# Patient Record
Sex: FEMALE | Race: WHITE | Employment: FULL TIME | URBAN - METROPOLITAN AREA
[De-identification: names, ages, dates, MRNs, and addresses within clinical notes are randomized per-mention and may not be internally consistent; named-entity substitution may affect disease eponyms.]

---

## 2017-04-10 ENCOUNTER — OP HISTORICAL/CONVERTED ENCOUNTER (OUTPATIENT)
Dept: OTHER | Age: 47
End: 2017-04-10

## 2017-05-12 ENCOUNTER — OP HISTORICAL/CONVERTED ENCOUNTER (OUTPATIENT)
Dept: OTHER | Age: 47
End: 2017-05-12

## 2017-09-12 ENCOUNTER — HOSPITAL ENCOUNTER (OUTPATIENT)
Dept: MAMMOGRAPHY | Age: 47
Discharge: HOME | End: 2017-09-12
Attending: OBSTETRICS & GYNECOLOGY
Payer: COMMERCIAL

## 2017-09-12 DIAGNOSIS — Z12.31 VISIT FOR SCREENING MAMMOGRAM: ICD-10-CM

## 2017-09-12 PROCEDURE — 77067 SCR MAMMO BI INCL CAD: CPT

## 2018-03-22 ENCOUNTER — OP HISTORICAL/CONVERTED ENCOUNTER (OUTPATIENT)
Dept: OTHER | Age: 48
End: 2018-03-22

## 2018-10-09 ENCOUNTER — HOSPITAL ENCOUNTER (OUTPATIENT)
Dept: MAMMOGRAPHY | Age: 48
Discharge: HOME | End: 2018-10-09
Attending: OBSTETRICS & GYNECOLOGY
Payer: COMMERCIAL

## 2018-10-09 DIAGNOSIS — Z12.39 SCREENING BREAST EXAMINATION: ICD-10-CM

## 2018-10-09 PROCEDURE — 77067 SCR MAMMO BI INCL CAD: CPT

## 2019-10-28 ENCOUNTER — HOSPITAL ENCOUNTER (OUTPATIENT)
Dept: MAMMOGRAPHY | Age: 49
Discharge: HOME | End: 2019-10-28
Attending: OBSTETRICS & GYNECOLOGY
Payer: COMMERCIAL

## 2019-10-28 DIAGNOSIS — Z12.31 ENCOUNTER FOR MAMMOGRAM TO ESTABLISH BASELINE MAMMOGRAM: ICD-10-CM

## 2019-10-28 PROCEDURE — 77067 SCR MAMMO BI INCL CAD: CPT

## 2020-10-07 ENCOUNTER — TRANSCRIBE ORDER (OUTPATIENT)
Dept: MAMMOGRAPHY | Age: 50
End: 2020-10-07

## 2020-10-07 DIAGNOSIS — Z12.31 VISIT FOR SCREENING MAMMOGRAM: Primary | ICD-10-CM

## 2020-10-29 ENCOUNTER — HOSPITAL ENCOUNTER (OUTPATIENT)
Dept: MAMMOGRAPHY | Age: 50
Discharge: HOME | End: 2020-10-29
Attending: OBSTETRICS & GYNECOLOGY
Payer: COMMERCIAL

## 2020-10-29 DIAGNOSIS — Z12.31 VISIT FOR SCREENING MAMMOGRAM: ICD-10-CM

## 2020-10-29 PROCEDURE — 77067 SCR MAMMO BI INCL CAD: CPT

## 2021-10-15 ENCOUNTER — TRANSCRIBE ORDER (OUTPATIENT)
Dept: SCHEDULING | Age: 51
End: 2021-10-15

## 2021-10-15 DIAGNOSIS — Z12.31 SCREENING MAMMOGRAM, ENCOUNTER FOR: Primary | ICD-10-CM

## 2021-11-01 ENCOUNTER — HOSPITAL ENCOUNTER (OUTPATIENT)
Dept: MAMMOGRAPHY | Age: 51
Discharge: HOME | End: 2021-11-01
Attending: OBSTETRICS & GYNECOLOGY
Payer: COMMERCIAL

## 2021-11-01 DIAGNOSIS — Z12.31 SCREENING MAMMOGRAM, ENCOUNTER FOR: ICD-10-CM

## 2021-11-01 PROCEDURE — 77067 SCR MAMMO BI INCL CAD: CPT

## 2021-11-24 ENCOUNTER — ANCILLARY PROCEDURE (OUTPATIENT)
Dept: CARDIOLOGY CLINIC | Age: 51
End: 2021-11-24

## 2021-11-24 ENCOUNTER — ANCILLARY PROCEDURE (OUTPATIENT)
Dept: CARDIOLOGY CLINIC | Age: 51
End: 2021-11-24
Payer: COMMERCIAL

## 2021-11-24 ENCOUNTER — OFFICE VISIT (OUTPATIENT)
Dept: CARDIOLOGY CLINIC | Age: 51
End: 2021-11-24

## 2021-11-24 VITALS
DIASTOLIC BLOOD PRESSURE: 78 MMHG | HEIGHT: 65 IN | BODY MASS INDEX: 27.82 KG/M2 | SYSTOLIC BLOOD PRESSURE: 110 MMHG | OXYGEN SATURATION: 98 % | HEART RATE: 72 BPM | WEIGHT: 167 LBS

## 2021-11-24 VITALS — WEIGHT: 167 LBS | HEIGHT: 65 IN | BODY MASS INDEX: 27.82 KG/M2

## 2021-11-24 DIAGNOSIS — I10 PRIMARY HYPERTENSION: ICD-10-CM

## 2021-11-24 DIAGNOSIS — R94.31 ABNORMAL EKG: ICD-10-CM

## 2021-11-24 DIAGNOSIS — R07.9 CHEST PAIN, UNSPECIFIED TYPE: Primary | ICD-10-CM

## 2021-11-24 DIAGNOSIS — R07.9 CHEST PAIN, UNSPECIFIED TYPE: ICD-10-CM

## 2021-11-24 LAB
ECHO AO ASC DIAM: 2.95 CM
ECHO AO ROOT DIAM: 3.26 CM
ECHO EST RA PRESSURE: 3 MMHG
ECHO LA AREA 4C: 11.75 CM2
ECHO LA MAJOR AXIS: 3.53 CM
ECHO LA MINOR AXIS: 1.93 CM
ECHO LA VOL 2C: 48.94 ML (ref 22–52)
ECHO LA VOL 4C: 28.76 ML (ref 22–52)
ECHO LA VOL BP: 41.7 ML (ref 22–52)
ECHO LA VOL/BSA BIPLANE: 22.79 ML/M2 (ref 16–28)
ECHO LA VOLUME INDEX A2C: 26.74 ML/M2 (ref 16–28)
ECHO LA VOLUME INDEX A4C: 15.72 ML/M2 (ref 16–28)
ECHO LV EDV A2C: 102.53 ML
ECHO LV EDV A4C: 110.41 ML
ECHO LV EDV BP: 106.31 ML (ref 56–104)
ECHO LV EDV INDEX A4C: 60.3 ML/M2
ECHO LV EDV INDEX BP: 58.1 ML/M2
ECHO LV EDV NDEX A2C: 56 ML/M2
ECHO LV EJECTION FRACTION A2C: 63 PERCENT
ECHO LV EJECTION FRACTION A4C: 60 PERCENT
ECHO LV EJECTION FRACTION BIPLANE: 61.2 PERCENT (ref 55–100)
ECHO LV ESV A2C: 38.2 ML
ECHO LV ESV A4C: 43.66 ML
ECHO LV ESV BP: 41.26 ML (ref 19–49)
ECHO LV ESV INDEX A2C: 20.9 ML/M2
ECHO LV ESV INDEX A4C: 23.9 ML/M2
ECHO LV ESV INDEX BP: 22.5 ML/M2
ECHO LV INTERNAL DIMENSION DIASTOLIC: 5 CM (ref 3.9–5.3)
ECHO LV INTERNAL DIMENSION SYSTOLIC: 3.27 CM
ECHO LV IVSD: 0.56 CM (ref 0.6–0.9)
ECHO LV MASS 2D: 109.6 G (ref 67–162)
ECHO LV MASS INDEX 2D: 59.9 G/M2 (ref 43–95)
ECHO LV POSTERIOR WALL DIASTOLIC: 0.79 CM (ref 0.6–0.9)
ECHO RIGHT VENTRICULAR SYSTOLIC PRESSURE (RVSP): 20.58 MMHG
ECHO TV REGURGITANT MAX VELOCITY: 209.66 CM/S
ECHO TV REGURGITANT PEAK GRADIENT: 17.58 MMHG
STRESS ANGINA INDEX: 1
STRESS BASELINE DIAS BP: 76 MMHG
STRESS BASELINE HR: 62 BPM
STRESS BASELINE SYS BP: 116 MMHG
STRESS ESTIMATED WORKLOAD: 10.1 METS
STRESS EXERCISE DUR MIN: NORMAL
STRESS O2 SAT PEAK: 99 %
STRESS O2 SAT REST: 99 %
STRESS PEAK DIAS BP: 84 MMHG
STRESS PEAK SYS BP: 158 MMHG
STRESS PERCENT HR ACHIEVED: 93 %
STRESS POST PEAK HR: 155 BPM
STRESS RATE PRESSURE PRODUCT: NORMAL BPM*MMHG
STRESS ST DEPRESSION: 1 MM
STRESS TARGET HR: 167 BPM

## 2021-11-24 PROCEDURE — 99205 OFFICE O/P NEW HI 60 MIN: CPT | Performed by: SPECIALIST

## 2021-11-24 PROCEDURE — 93321 DOPPLER ECHO F-UP/LMTD STD: CPT | Performed by: SPECIALIST

## 2021-11-24 PROCEDURE — 93000 ELECTROCARDIOGRAM COMPLETE: CPT | Performed by: SPECIALIST

## 2021-11-24 PROCEDURE — 93325 DOPPLER ECHO COLOR FLOW MAPG: CPT | Performed by: SPECIALIST

## 2021-11-24 PROCEDURE — 93308 TTE F-UP OR LMTD: CPT | Performed by: SPECIALIST

## 2021-11-24 PROCEDURE — 93015 CV STRESS TEST SUPVJ I&R: CPT | Performed by: SPECIALIST

## 2021-11-24 RX ORDER — BUDESONIDE AND FORMOTEROL FUMARATE DIHYDRATE 160; 4.5 UG/1; UG/1
2 AEROSOL RESPIRATORY (INHALATION) AS NEEDED
Qty: 10.2 G | Refills: 0
Start: 2021-11-24

## 2021-11-24 RX ORDER — PROGESTERONE 200 MG/1
200 CAPSULE ORAL DAILY
COMMUNITY

## 2021-11-24 RX ORDER — DULOXETIN HYDROCHLORIDE 60 MG/1
60 CAPSULE, DELAYED RELEASE ORAL DAILY
Qty: 1 CAPSULE | Refills: 0 | Status: SHIPPED | OUTPATIENT
Start: 2021-11-24

## 2021-11-24 RX ORDER — SERTRALINE HYDROCHLORIDE 25 MG/1
100 TABLET, FILM COATED ORAL DAILY
Qty: 1 TABLET | Refills: 0
Start: 2021-11-24

## 2021-11-24 NOTE — PROGRESS NOTES
Orders for Treadmill stress test and echo today   See NP in one month   per Dr. Stephie Luu VO.   Dx: abn ekg

## 2021-11-24 NOTE — H&P (VIEW-ONLY)
Priyank Smith MD. [facility]              Patient: Mulu Mai  : 1970      Today's Date: 2021          HISTORY OF PRESENT ILLNESS:     History of Present Illness:  Referred from [facility] for CP. Not felt well for weeks- has had general chest tightness - one day at work not feeling well, broke out in sweat --> went to [facility] and asked to see Cardiology. Since then just feels \"blah\" with mild chest discomfort. Since then BP high one day and has been following it at home - has been up and down. Walking up and down steps causes some discomfort. PAST MEDICAL HISTORY:     Past Medical History:   Diagnosis Date    Asthma     Elevated blood pressure reading     Mood disorder (HCC)     MADISON on CPAP          MEDICATIONS:     Current Outpatient Medications   Medication Sig Dispense Refill    sertraline (ZOLOFT) 25 mg tablet Take 4 Tablets by mouth daily. 1 Tablet 0    DULoxetine (CYMBALTA) 60 mg capsule Take 1 Capsule by mouth daily. 1 Capsule 0    budesonide-formoteroL (SYMBICORT) 160-4.5 mcg/actuation HFAA Take 2 Puffs by inhalation as needed (sob). 10.2 g 0           Allergies   Allergen Reactions    Ofloxacin Vertigo, Nausea Only, Nausea and Vomiting, Other (comments) and Unknown (comments)           SOCIAL HISTORY:     Social History     Tobacco Use    Smoking status: Never Smoker    Smokeless tobacco: Never Used   Vaping Use    Vaping Use: Never used   Substance Use Topics    Alcohol use: Never    Drug use: Never         FAMILY HISTORY:     Family History   Problem Relation Age of Onset    Breast Cancer Maternal Aunt     Hypertension Father     Heart Attack Paternal Grandmother     Atrial Fibrillation Mother              REVIEW OF SYMPTOMS:     Review of Symptoms:  Constitutional: Negative for fever, chills  HEENT: Negative for nosebleeds, tinnitus, and vision changes.    Respiratory: Negative for cough, wheezing  Cardiovascular: Negative for orthopnea, claudication, syncope, and PND. Gastrointestinal: Negative for abdominal pain, diarrhea, melena. Genitourinary: Negative for dysuria  Musculoskeletal: Negative for myalgias. Skin: Negative for rash  Heme: No problems bleeding. Neurological: Negative for speech change and focal weakness. + insomnia       PHYSICAL EXAM:     Physical Exam:  Visit Vitals  /78 (BP 1 Location: Left upper arm, BP Patient Position: Sitting, BP Cuff Size: Large adult)   Pulse 72   Ht 5' 5\" (1.651 m)   Wt 167 lb (75.8 kg)   SpO2 98%   BMI 27.79 kg/m²     Patient appears generally well, mood and affect are appropriate and pleasant. HEENT:  Hearing intact, non-icteric, normocephalic, atraumatic. Neck Exam: Supple, No JVD or carotid bruits. Lung Exam: Clear to auscultation, even breath sounds. Cardiac Exam: Regular rate and rhythm with no murmur or rub  Abdomen: Soft, non-tender, normal bowel sounds. No bruits or masses. Extremities: Moves all ext well. No lower extremity edema. MSKTL: Overall good ROM ext  Skin: No significant rashes  Vascular: 2+ dorsalis pedis pulses bilaterally. Psych: Appropriate affect  Neuro - Grossly intact        LABS / OTHER STUDIES reviewed:     Lab Results   Component Value Date/Time    Sodium 136 07/16/2010 03:17 PM    Potassium 3.9 07/16/2010 03:17 PM    Chloride 96 (L) 07/16/2010 03:17 PM    CO2 33 (H) 07/16/2010 03:17 PM    Anion gap 7 07/16/2010 03:17 PM    Glucose 82 07/16/2010 03:17 PM    BUN 14 07/16/2010 03:17 PM    Creatinine 0.6 07/16/2010 03:17 PM    BUN/Creatinine ratio 23 (H) 07/16/2010 03:17 PM    GFR est AA >60 07/16/2010 03:17 PM    GFR est non-AA >60 07/16/2010 03:17 PM    Calcium 9.4 07/16/2010 03:17 PM           CARDIAC DIAGNOSTICS:     Cardiac Evaluation Includes:  I reviewed the results below.      EKG 11/24/21 - sinus, septal Q waves       Treadmill Stress Test 11/24/21 - walked 8 min (10 METS),   Ischemic EKG changes with 1 mm slow upsloping ST depression in the inferior leads at peak exercise and 1 mm horizontal ST depression in the inferior leads in recovery. 5/10 chest pain at peak exercise    Limited Echo 11/24/21 - LVEF 61%, no valve disease           ASSESSMENT AND PLAN:     Assessment and Plan:  1) Typical CP   - has not felt well for weeks with chest discomfort that is worse with exertion   - We proceeded with a treadmill stress test 11/24/21 ---> she had exertional chest pain and 1 mm ST depression. Moderate risk Duke Treadmill Score   - I reviewed options for further evaluation (stress nuclear, coronary CTA, vs Cath) --> given that she has not felt well for weeks, she opted to proceed with a cardiac cath. Risks of MI, CVA, GISELL, death, etc reviewed. - I did advise her to go to ED if she has worsening CP    2) CV risk assessment   - info given on  CT heart scan as optional test (after cardiac cath)  - will get recent labs from PCP     3) HTN  - BP has been up and down   - Asked her to keep track of BP at home BID to help guide care     4) Cardiac cath in next 1-2 weeks. See NP in one month   [facility]. Caring for ill father.  with 2 grown kids. Helder Hampton MD, [address], [facility] [address]  Ph: [phone]   Ph [phone]

## 2021-11-24 NOTE — PATIENT INSTRUCTIONS
High Blood Pressure: Care Instructions  Overview     It's normal for blood pressure to go up and down throughout the day. But if it stays up, you have high blood pressure. Another name for high blood pressure is hypertension. Despite what a lot of people think, high blood pressure usually doesn't cause headaches or make you feel dizzy or lightheaded. It usually has no symptoms. But it does increase your risk of stroke, heart attack, and other problems. You and your doctor will talk about your risks of these problems based on your blood pressure. Your doctor will give you a goal for your blood pressure. Your goal will be based on your health and your age. Lifestyle changes, such as eating healthy and being active, are always important to help lower blood pressure. You might also take medicine to reach your blood pressure goal.  Follow-up care is a key part of your treatment and safety. Be sure to make and go to all appointments, and call your doctor if you are having problems. It's also a good idea to know your test results and keep a list of the medicines you take. How can you care for yourself at home? Medical treatment  · If you stop taking your medicine, your blood pressure will go back up. You may take one or more types of medicine to lower your blood pressure. Be safe with medicines. Take your medicine exactly as prescribed. Call your doctor if you think you are having a problem with your medicine. · Talk to your doctor before you start taking aspirin every day. Aspirin can help certain people lower their risk of a heart attack or stroke. But taking aspirin isn't right for everyone, because it can cause serious bleeding. · See your doctor regularly. You may need to see the doctor more often at first or until your blood pressure comes down. · If you are taking blood pressure medicine, talk to your doctor before you take decongestants or anti-inflammatory medicine, such as ibuprofen.  Some of these medicines can raise blood pressure. · Learn how to check your blood pressure at home. Lifestyle changes  · Stay at a healthy weight. This is especially important if you put on weight around the waist. Losing even 10 pounds can help you lower your blood pressure. · If your doctor recommends it, get more exercise. Walking is a good choice. Bit by bit, increase the amount you walk every day. Try for at least 30 minutes on most days of the week. You also may want to swim, bike, or do other activities. · Avoid or limit alcohol. Talk to your doctor about whether you can drink any alcohol. · Try to limit how much sodium you eat to less than 2,300 milligrams (mg) a day. Your doctor may ask you to try to eat less than 1,500 mg a day. · Eat plenty of fruits (such as bananas and oranges), vegetables, legumes, whole grains, and low-fat dairy products. · Lower the amount of saturated fat in your diet. Saturated fat is found in animal products such as milk, cheese, and meat. Limiting these foods may help you lose weight and also lower your risk for heart disease. · Do not smoke. Smoking increases your risk for heart attack and stroke. If you need help quitting, talk to your doctor about stop-smoking programs and medicines. These can increase your chances of quitting for good. When should you call for help? Call 911  anytime you think you may need emergency care. This may mean having symptoms that suggest that your blood pressure is causing a serious heart or blood vessel problem. Your blood pressure may be over 180/120. For example, call 911 if:    · You have symptoms of a heart attack. These may include:  ? Chest pain or pressure, or a strange feeling in the chest.  ? Sweating. ? Shortness of breath. ? Nausea or vomiting. ? Pain, pressure, or a strange feeling in the back, neck, jaw, or upper belly or in one or both shoulders or arms. ? Lightheadedness or sudden weakness.   ? A fast or irregular heartbeat.     · You have symptoms of a stroke. These may include:  ? Sudden numbness, tingling, weakness, or loss of movement in your face, arm, or leg, especially on only one side of your body. ? Sudden vision changes. ? Sudden trouble speaking. ? Sudden confusion or trouble understanding simple statements. ? Sudden problems with walking or balance. ? A sudden, severe headache that is different from past headaches.     · You have severe back or belly pain. Do not wait until your blood pressure comes down on its own. Get help right away. Call your doctor now or seek immediate care if:    · Your blood pressure is much higher than normal (such as 180/120 or higher), but you don't have symptoms.     · You think high blood pressure is causing symptoms, such as:  ? Severe headache.  ? Blurry vision. Watch closely for changes in your health, and be sure to contact your doctor if:    · Your blood pressure measures higher than your doctor recommends at least 2 times. That means the top number is higher or the bottom number is higher, or both.     · You think you may be having side effects from your blood pressure medicine. Where can you learn more? Go to http://www.gray.com/  Enter [id number] in the search box to learn more about \"High Blood Pressure: Care Instructions. \"  Current as of: April 29, 2021               Content Version: 13.0  © 2006-2021 [employer]. Care instructions adapted under license by [employer] (which disclaims liability or warranty for this information). If you have questions about a medical condition or this instruction, always ask your healthcare professional. [place] [address] any warranty or liability for your use of this information.

## 2021-11-24 NOTE — PROGRESS NOTES
Identified pt with two pt identifiers(name and ). Reviewed record in preparation for visit and have obtained necessary documentation. Chief Complaint   Patient presents with   [address] Patient     seen at [facility] on 21 for chest discomfort    Abnormal EKG        Vitals:    21 0852   BP: 110/78   Pulse: 72   SpO2: 98%   Weight: 167 lb (75.8 kg)   Height: 5' 5\" (1.651 m)   PainSc:   5   PainLoc: Generalized       Health Maintenance Due   Topic    Hepatitis C Screening     COVID-19 Vaccine (1)    DTaP/Tdap/Td series (1 - Tdap)    Cervical cancer screen     Lipid Screen     Colorectal Cancer Screening Combo     Shingrix Vaccine Age 50> (1 of 2)    Flu Vaccine (1)       Coordination of Care Questionnaire:  :   1) Have you been to an emergency room, urgent care, or hospitalized since your last visit? If yes, where when, and reason for visit? N/A      2. Have seen or consulted any other health care provider since your last visit? If yes, where when, and reason for visit?   N/A

## 2021-11-24 NOTE — PROGRESS NOTES
Wolf Magallanes MD. [facility] [place]              Patient: Taryn Hurt  : 1970      Today's Date: 2021          HISTORY OF PRESENT ILLNESS:     History of Present Illness:  Referred from [facility] for CP. Not felt well for weeks- has had general chest tightness - one day at work not feeling well, broke out in sweat --> went to [facility] and asked to see Cardiology. Since then just feels \"blah\" with mild chest discomfort. Since then BP high one day and has been following it at home - has been up and down. Walking up and down steps causes some discomfort. PAST MEDICAL HISTORY:     Past Medical History:   Diagnosis Date    Asthma     Elevated blood pressure reading     Mood disorder (HCC)     MADISON on CPAP          MEDICATIONS:     Current Outpatient Medications   Medication Sig Dispense Refill    sertraline (ZOLOFT) 25 mg tablet Take 4 Tablets by mouth daily. 1 Tablet 0    DULoxetine (CYMBALTA) 60 mg capsule Take 1 Capsule by mouth daily. 1 Capsule 0    budesonide-formoteroL (SYMBICORT) 160-4.5 mcg/actuation HFAA Take 2 Puffs by inhalation as needed (sob). 10.2 g 0           Allergies   Allergen Reactions    Ofloxacin Vertigo, Nausea Only, Nausea and Vomiting, Other (comments) and Unknown (comments)           SOCIAL HISTORY:     Social History     Tobacco Use    Smoking status: Never Smoker    Smokeless tobacco: Never Used   Vaping Use    Vaping Use: Never used   Substance Use Topics    Alcohol use: Never    Drug use: Never         FAMILY HISTORY:     Family History   Problem Relation Age of Onset    Breast Cancer Maternal Aunt     Hypertension Father     Heart Attack Paternal Grandmother     Atrial Fibrillation Mother              REVIEW OF SYMPTOMS:     Review of Symptoms:  Constitutional: Negative for fever, chills  HEENT: Negative for nosebleeds, tinnitus, and vision changes.    Respiratory: Negative for cough, wheezing  Cardiovascular: Negative for orthopnea, claudication, syncope, and PND. Gastrointestinal: Negative for abdominal pain, diarrhea, melena. Genitourinary: Negative for dysuria  Musculoskeletal: Negative for myalgias. Skin: Negative for rash  Heme: No problems bleeding. Neurological: Negative for speech change and focal weakness. + insomnia       PHYSICAL EXAM:     Physical Exam:  Visit Vitals  /78 (BP 1 Location: Left upper arm, BP Patient Position: Sitting, BP Cuff Size: Large adult)   Pulse 72   Ht 5' 5\" (1.651 m)   Wt 167 lb (75.8 kg)   SpO2 98%   BMI 27.79 kg/m²     Patient appears generally well, mood and affect are appropriate and pleasant. HEENT:  Hearing intact, non-icteric, normocephalic, atraumatic. Neck Exam: Supple, No JVD or carotid bruits. Lung Exam: Clear to auscultation, even breath sounds. Cardiac Exam: Regular rate and rhythm with no murmur or rub  Abdomen: Soft, non-tender, normal bowel sounds. No bruits or masses. Extremities: Moves all ext well. No lower extremity edema. MSKTL: Overall good ROM ext  Skin: No significant rashes  Vascular: 2+ dorsalis pedis pulses bilaterally. Psych: Appropriate affect  Neuro - Grossly intact        LABS / OTHER STUDIES reviewed:     Lab Results   Component Value Date/Time    Sodium 136 07/16/2010 03:17 PM    Potassium 3.9 07/16/2010 03:17 PM    Chloride 96 (L) 07/16/2010 03:17 PM    CO2 33 (H) 07/16/2010 03:17 PM    Anion gap 7 07/16/2010 03:17 PM    Glucose 82 07/16/2010 03:17 PM    BUN 14 07/16/2010 03:17 PM    Creatinine 0.6 07/16/2010 03:17 PM    BUN/Creatinine ratio 23 (H) 07/16/2010 03:17 PM    GFR est AA >60 07/16/2010 03:17 PM    GFR est non-AA >60 07/16/2010 03:17 PM    Calcium 9.4 07/16/2010 03:17 PM           CARDIAC DIAGNOSTICS:     Cardiac Evaluation Includes:  I reviewed the results below.      EKG 11/24/21 - sinus, septal Q waves       Treadmill Stress Test 11/24/21 - walked 8 min (10 METS),   Ischemic EKG changes with 1 mm slow upsloping ST depression in the inferior leads at peak exercise and 1 mm horizontal ST depression in the inferior leads in recovery. 5/10 chest pain at peak exercise    Limited Echo 11/24/21 - LVEF 61%, no valve disease           ASSESSMENT AND PLAN:     Assessment and Plan:  1) Typical CP   - has not felt well for weeks with chest discomfort that is worse with exertion   - We proceeded with a treadmill stress test 11/24/21 ---> she had exertional chest pain and 1 mm ST depression. Moderate risk Duke Treadmill Score   - I reviewed options for further evaluation (stress nuclear, coronary CTA, vs Cath) --> given that she has not felt well for weeks, she opted to proceed with a cardiac cath. Risks of MI, CVA, GISELL, death, etc reviewed. - I did advise her to go to ED if she has worsening CP    2) CV risk assessment   - info given on  CT heart scan as optional test (after cardiac cath)  - will get recent labs from PCP     3) HTN  - BP has been up and down   - Asked her to keep track of BP at home BID to help guide care     4) Cardiac cath in next 1-2 weeks. See NP in one month   [facility]. Caring for ill father.  with 2 grown kids. Naheed Keenan MD, [address].  [address], [address] [place] [address]  Ph: [phone]   Ph [phone]

## 2021-12-07 ENCOUNTER — TRANSCRIBE ORDER (OUTPATIENT)
Dept: REGISTRATION | Age: 51
End: 2021-12-07

## 2021-12-07 ENCOUNTER — HOSPITAL ENCOUNTER (OUTPATIENT)
Dept: LAB | Age: 51
Discharge: HOME | End: 2021-12-07
Payer: COMMERCIAL

## 2021-12-07 DIAGNOSIS — Z01.812 PRE-PROCEDURAL LABORATORY EXAMINATIONS: Primary | ICD-10-CM

## 2021-12-07 DIAGNOSIS — Z01.812 PRE-PROCEDURAL LABORATORY EXAMINATIONS: ICD-10-CM

## 2021-12-07 PROCEDURE — U0005 INFEC AGEN DETEC AMPLI PROBE: HCPCS

## 2021-12-08 DIAGNOSIS — R94.31 ABNORMAL EKG: ICD-10-CM

## 2021-12-08 DIAGNOSIS — R07.9 CHEST PAIN, UNSPECIFIED TYPE: Primary | ICD-10-CM

## 2021-12-08 LAB
BUN SERPL-MCNC: 11 MG/DL (ref 6–24)
BUN/CREAT SERPL: 15 (ref 9–23)
CALCIUM SERPL-MCNC: 9.2 MG/DL (ref 8.7–10.2)
CHLORIDE SERPL-SCNC: 103 MMOL/L (ref 96–106)
CO2 SERPL-SCNC: 24 MMOL/L (ref 20–29)
CREAT SERPL-MCNC: 0.75 MG/DL (ref 0.57–1)
ERYTHROCYTE [DISTWIDTH] IN BLOOD BY AUTOMATED COUNT: 12.3 % (ref 11.7–15.4)
GLUCOSE SERPL-MCNC: 98 MG/DL (ref 65–99)
HCT VFR BLD AUTO: 39.3 % (ref 34–46.6)
HGB BLD-MCNC: 12.7 G/DL (ref 11.1–15.9)
MCH RBC QN AUTO: 31.4 PG (ref 26.6–33)
MCHC RBC AUTO-ENTMCNC: 32.3 G/DL (ref 31.5–35.7)
MCV RBC AUTO: 97 FL (ref 79–97)
PLATELET # BLD AUTO: 319 X10E3/UL (ref 150–450)
POTASSIUM SERPL-SCNC: 4.5 MMOL/L (ref 3.5–5.2)
RBC # BLD AUTO: 4.05 X10E6/UL (ref 3.77–5.28)
SARS-COV-2, XPLCVT: NOT DETECTED
SODIUM SERPL-SCNC: 140 MMOL/L (ref 134–144)
SOURCE, COVRS: NORMAL
WBC # BLD AUTO: 4.6 X10E3/UL (ref 3.4–10.8)

## 2021-12-08 RX ORDER — SODIUM CHLORIDE 0.9 % (FLUSH) 0.9 %
5-40 SYRINGE (ML) INJECTION AS NEEDED
Status: CANCELLED | OUTPATIENT
Start: 2021-12-10

## 2021-12-08 RX ORDER — SODIUM CHLORIDE 9 MG/ML
75 INJECTION, SOLUTION INTRAVENOUS CONTINUOUS
Status: CANCELLED | OUTPATIENT
Start: 2021-12-10

## 2021-12-08 RX ORDER — SODIUM CHLORIDE 0.9 % (FLUSH) 0.9 %
5-40 SYRINGE (ML) INJECTION EVERY 8 HOURS
Status: CANCELLED | OUTPATIENT
Start: 2021-12-10

## 2021-12-09 NOTE — PROGRESS NOTES
1:50 PM    Spoke with patient to verify arrival time, to remain NPO after midnight, and  for transportation home. Patient verbalized understanding. Patient denies any signs, symptoms, or exposure to Covid. Expressed importance for patient to quarantined until after procedure to reduce risk of exposure. Patient verbalizes understanding.

## 2021-12-10 ENCOUNTER — HOSPITAL ENCOUNTER (OUTPATIENT)
Age: 51
Setting detail: OUTPATIENT SURGERY
Discharge: HOME | End: 2021-12-10
Attending: INTERNAL MEDICINE | Admitting: INTERNAL MEDICINE
Payer: COMMERCIAL

## 2021-12-10 VITALS
HEIGHT: 65 IN | HEART RATE: 69 BPM | RESPIRATION RATE: 11 BRPM | TEMPERATURE: 98 F | OXYGEN SATURATION: 97 % | DIASTOLIC BLOOD PRESSURE: 63 MMHG | WEIGHT: 164.4 LBS | BODY MASS INDEX: 27.39 KG/M2 | SYSTOLIC BLOOD PRESSURE: 105 MMHG

## 2021-12-10 DIAGNOSIS — R07.9 CHEST PAIN, UNSPECIFIED TYPE: ICD-10-CM

## 2021-12-10 DIAGNOSIS — R94.31 ABNORMAL EKG: ICD-10-CM

## 2021-12-10 DIAGNOSIS — R94.31 NONSPECIFIC ABNORMAL ELECTROCARDIOGRAM (ECG) (EKG): ICD-10-CM

## 2021-12-10 PROCEDURE — 99153 MOD SED SAME PHYS/QHP EA: CPT | Performed by: INTERNAL MEDICINE

## 2021-12-10 PROCEDURE — 74011000636 HC RX REV CODE- 636: Performed by: INTERNAL MEDICINE

## 2021-12-10 PROCEDURE — 93454 CORONARY ARTERY ANGIO S&I: CPT | Performed by: INTERNAL MEDICINE

## 2021-12-10 PROCEDURE — 77030019569 HC BND COMPR RAD TERU -B: Performed by: INTERNAL MEDICINE

## 2021-12-10 PROCEDURE — 77030029065 HC DRSG HEMO QCLOT ZMED -B

## 2021-12-10 PROCEDURE — C1769 GUIDE WIRE: HCPCS | Performed by: INTERNAL MEDICINE

## 2021-12-10 PROCEDURE — 77030008543 HC TBNG MON PRSS MRTM -A: Performed by: INTERNAL MEDICINE

## 2021-12-10 PROCEDURE — 74011000250 HC RX REV CODE- 250: Performed by: INTERNAL MEDICINE

## 2021-12-10 PROCEDURE — 77030004532 HC CATH ANGI DX IMP BSC -A: Performed by: INTERNAL MEDICINE

## 2021-12-10 PROCEDURE — C1887 CATHETER, GUIDING: HCPCS | Performed by: INTERNAL MEDICINE

## 2021-12-10 PROCEDURE — C1894 INTRO/SHEATH, NON-LASER: HCPCS | Performed by: INTERNAL MEDICINE

## 2021-12-10 PROCEDURE — 2709999900 HC NON-CHARGEABLE SUPPLY: Performed by: INTERNAL MEDICINE

## 2021-12-10 PROCEDURE — 99152 MOD SED SAME PHYS/QHP 5/>YRS: CPT | Performed by: INTERNAL MEDICINE

## 2021-12-10 PROCEDURE — 74011250636 HC RX REV CODE- 250/636: Performed by: INTERNAL MEDICINE

## 2021-12-10 RX ORDER — SODIUM CHLORIDE 0.9 % (FLUSH) 0.9 %
5-40 SYRINGE (ML) INJECTION EVERY 8 HOURS
Status: DISCONTINUED | OUTPATIENT
Start: 2021-12-10 | End: 2021-12-10 | Stop reason: HOSPADM

## 2021-12-10 RX ORDER — SODIUM CHLORIDE 0.9 % (FLUSH) 0.9 %
5-40 SYRINGE (ML) INJECTION AS NEEDED
Status: DISCONTINUED | OUTPATIENT
Start: 2021-12-10 | End: 2021-12-10 | Stop reason: HOSPADM

## 2021-12-10 RX ORDER — LIDOCAINE HYDROCHLORIDE 10 MG/ML
INJECTION INFILTRATION; PERINEURAL AS NEEDED
Status: DISCONTINUED | OUTPATIENT
Start: 2021-12-10 | End: 2021-12-10 | Stop reason: HOSPADM

## 2021-12-10 RX ORDER — MIDAZOLAM HYDROCHLORIDE 1 MG/ML
INJECTION, SOLUTION INTRAMUSCULAR; INTRAVENOUS AS NEEDED
Status: DISCONTINUED | OUTPATIENT
Start: 2021-12-10 | End: 2021-12-10 | Stop reason: HOSPADM

## 2021-12-10 RX ORDER — SODIUM CHLORIDE 9 MG/ML
75 INJECTION, SOLUTION INTRAVENOUS CONTINUOUS
Status: DISCONTINUED | OUTPATIENT
Start: 2021-12-10 | End: 2021-12-10 | Stop reason: HOSPADM

## 2021-12-10 RX ORDER — DIPHENHYDRAMINE HYDROCHLORIDE 50 MG/ML
INJECTION, SOLUTION INTRAMUSCULAR; INTRAVENOUS AS NEEDED
Status: DISCONTINUED | OUTPATIENT
Start: 2021-12-10 | End: 2021-12-10 | Stop reason: HOSPADM

## 2021-12-10 RX ORDER — HEPARIN SODIUM 1000 [USP'U]/ML
INJECTION, SOLUTION INTRAVENOUS; SUBCUTANEOUS AS NEEDED
Status: DISCONTINUED | OUTPATIENT
Start: 2021-12-10 | End: 2021-12-10 | Stop reason: HOSPADM

## 2021-12-10 RX ORDER — HEPARIN SODIUM 200 [USP'U]/100ML
INJECTION, SOLUTION INTRAVENOUS
Status: COMPLETED | OUTPATIENT
Start: 2021-12-10 | End: 2021-12-10

## 2021-12-10 RX ORDER — VERAPAMIL HYDROCHLORIDE 2.5 MG/ML
INJECTION, SOLUTION INTRAVENOUS AS NEEDED
Status: DISCONTINUED | OUTPATIENT
Start: 2021-12-10 | End: 2021-12-10 | Stop reason: HOSPADM

## 2021-12-10 RX ORDER — FENTANYL CITRATE 50 UG/ML
INJECTION, SOLUTION INTRAMUSCULAR; INTRAVENOUS AS NEEDED
Status: DISCONTINUED | OUTPATIENT
Start: 2021-12-10 | End: 2021-12-10 | Stop reason: HOSPADM

## 2021-12-10 NOTE — ROUTINE PROCESS
9:51 AM  Patient arrived. ID and allergies verified verbally with patient. Pt voices understanding of procedure to be performed. Consent obtained. Pt prepped for procedure. Pt denies contrast allergy. Patient denies taking any blood thinners.

## 2021-12-10 NOTE — Clinical Note
TRANSFER - OUT REPORT:     Verbal report given to: JOSE EDUARDO (at bedside). Report consisted of patient's Situation, Background, Assessment and   Recommendations(SBAR). Opportunity for questions and clarification was provided. Patient transported with a Registered Nurse. Patient transported to: recovery.

## 2021-12-10 NOTE — PROGRESS NOTES
1307: 2 ml air released from TR Band. No bleeding or hematoma noted. Radial and Ulnar pulse on right wrist palpable. Pt tolerated well. Will continue to monitor. 1312: 2 ml air released from TR Band. No bleeding or hematoma noted. Radial and Ulnar pulse on right wrist palpable. Pt tolerated well. Will continue to monitor. 1317: 3 ml air released from TR Band. No bleeding or hematoma noted. Radial and Ulnar pulse on right wrist palpable. Pt tolerated well. Will continue to monitor. 1322: Air release completed. TR Band removed from right wrist. No bleeding or  Hematoma. Dressing applied. Wrist immobilizer in place. Radial and ulnar pulse remain palpable on affected extremity. Pt tolerated well. Instructions given to pt regarding movement and activity restrictions. Pt voiced understanding.

## 2021-12-10 NOTE — PROCEDURES
BRIEF PROCEDURE NOTE    Date of Procedure: 12/10/2021   Preoperative Diagnosis: Chest Pain/Abnormal stress test  Postoperative Diagnosis: No Sig CAD    Procedure: Left heart cath, LV angiography, coronary angiography  Interventional Cardiologist: Deepika Knox MD  Assistant : none  Anesthesia: local + IV sedation  Estimated Blood Loss: Minimal  Findings:     R Radial access - 6 F sheath    RCA - JR4; LCA Delcid EBU  Tortuous cors    L Main: Med MLI    LAD: Prox/ Mid - Med; Distal very small; Small D1; Very small D2/D2 - Nml    LCflex: Med; MLI; OM1 small to med - MLI    RCA: Dominant; med; MLI; Small PDA/PLB - nml    LVEDP: Valve not crossed    LVEF: Not assessed      PCI: none      Specimens Removed : None    Devices implanted : None    Complications: None    Closure Device: R Radial - TR band        See full cath note.     Complications: none    Deepika Knox MD

## 2021-12-10 NOTE — Clinical Note
TRANSFER - IN REPORT:     Verbal report received from: Frankey Melia. Report consisted of patient's Situation, Background, Assessment and   Recommendations(SBAR). Opportunity for questions and clarification was provided. Assessment completed upon patient's arrival to unit and care assumed. Patient transported with a Cardiac Cath Tech / Patient Care Tech.

## 2021-12-10 NOTE — INTERVAL H&P NOTE
Update History & Physical    The Patient's History and Physical of  12/10/21 was reviewed with the patient and I examined the patient. There was no change. The surgical site was confirmed by the patient and me. Plan:  The risk, benefits, expected outcome, and alternative to the recommended procedure have been discussed with the patient. Patient understands and wants to proceed with the procedure.     Electronically signed by Steff Wilhelm MD on 12/10/2021 at 9:42 AM

## 2021-12-10 NOTE — DISCHARGE INSTRUCTIONS
Dax Romero MD    [id number] [facility], [id number] [place] [address]    Office [phone],ZAS [phone]    Patient ID:  Mulu Mai  [id number]  43 y.o.  1970    Admit Date: 12/10/2021    Discharge Date: 12/10/2021     Admitting Physician: Dax Romero MD     Discharge Physician: Dax Romero MD    Admission Diagnoses:   Nonspecific abnormal electrocardiogram (ECG) (EKG) [R94.31]    Discharge Diagnoses: Active Problems:    * No active hospital problems. *      Discharge Condition: Good    Cardiology Procedures this Admission:  Diagnostic left heart catheterization    Disposition: home    Patient Instructions:         Reference discharge instructions provided by nursing for diet and activity. Follow-up with Dr Hailee Polk as scheduled    Signed:  Dax Romero MD  12/10/2021  12:05 PM      Radial Cardiac Catheterization/Angiography Discharge Instructions    It is normal to feel tired the first couple days. Take it easy and follow the physicians instructions. CHECK THE CATHETER INSERTION SITE DAILY:    Remove the wrist dressing 24 hours after the procedure. You may shower 24 hours after the procedure. Wash with soap and water and pat dry. Gentle cleaning of the site with soap and water is sufficient, cover with a dry clean dressing or bandage. Do not apply creams or powders to the area. No soaking the wrist for 3 days. Leave the puncture site open to air after 24 hours post-procedure. CALL THE PHYSICIANS:     If the site becomes red, swollen or feels warm to the touch  If there is bleeding or drainage or if there is unusual pain at the radial site. If there is any minor oozing, you may apply a band-aid and remove after 12 hours.    If the bleeding continues, hold pressure with the middle finger against the puncture site and the thumb against the back of the wrist,call 911 to be transported to the hospital.  DO NOT [address] [facility] ANYONE ELSE DRIVE YOU - CALL [phone]. ACTIVITY:   For the first 24 hours do not manipulate the wrist.  No lifting, pushing or pulling over 5 pounds with the affected wrist for 7 daysand no straining the insertion site. Do not life grocery bags or the garbage can, do not run the vacuum  or  for 7 days. Start with short walks as in the hospital and gradually increase as tolerated each day. It is recommended to walk 30 minutes 5-7 days per week. Follow your physicians instructions on activity. Avoid walking outside in extremes of heat or cold. Walk inside when it is cold and windy or hot and humid. Things to keep in mind:  No driving for at least 24 hours, or as designated by your physician. Limit the number of times you go up and down the stairs  Take rests and pace yourself with activity. Be careful and do not strain with bowel movements. MEDICATIONS:    Take all medications as prescribed  Call your physician if you have any questions  Keep an updated list of your medications with you at all times and give a list to your physician and pharmacist    SIGNS AND SYMPTOMS:   Be cautious of symptoms of angina or recurrent symptoms such as chest discomfort, unusual shortness of breath or fatigue. These could be symptoms of restenosis, a new blockage or a heart attack. If your symptoms are relieved with rest it is still recommended that you notify your physician of recurrent chest pain or discomfort. For CHEST PAIN or symptoms of angina not relieved with rest:  If the discomfort is not relieved with rest, and you have been prescribed Nitroglycerin, take as directed (taken under the tongue, one at a time 5 minutes apart for a total of 3 doses). If the discomfort is not relieved after the 3rd nitroglycerin, call 911. If you have not been prescribed Nitroglycerin  and your chest discomfort is not relieved with rest, call 911. AFTER CARE:   Follow up with your physician as instructed.   Follow a heart healthy diet with proper portion control, daily stress management, daily exercise, blood pressure and cholesterol control , and smoking cessation.

## 2021-12-10 NOTE — PROGRESS NOTES
11:03 AM  TRANSFER - OUT REPORT:    Verbal report given to Saumya(name) on Erica Braga  being transferred to cath lab(unit) for ordered procedure       Report consisted of patients Situation, Background, Assessment and   Recommendations(SBAR). Information from the following report(s) SBAR was reviewed with the receiving nurse. Lines:   Peripheral IV 12/10/21 Left Antecubital (Active)   Site Assessment Clean, dry, & intact 12/10/21 1024   Phlebitis Assessment 0 12/10/21 1024   Dressing Status Clean, dry, & intact 12/10/21 1024   Dressing Type Transparent 12/10/21 1024   Hub Color/Line Status Pink 12/10/21 1024   Alcohol Cap Used No 12/10/21 1024        Opportunity for questions and clarification was provided. Patient transported with:   Registered Nurse    11:58 AM  TRANSFER - IN REPORT:    Verbal report received from Ed RN(name) on Erica Braga  being received from cath lab(unit) for routine post - op      Report consisted of patients Situation, Background, Assessment and   Recommendations(SBAR). Information from the following report(s) SBAR and Procedure Summary was reviewed with the receiving nurse. Opportunity for questions and clarification was provided. Assessment completed upon patients arrival to unit and care assumed. 2:28 PM  Discharge instructions reviewed with patient and family. Voiced understanding. Patient given copy of discharge instructions to take home. 2:35 PM  Patient ambulates in hallway or on unit. 2:45 PM  Pt discharged via wheelchair with . Personal belongings with patient upon discharge.

## 2021-12-14 ENCOUNTER — TELEPHONE (OUTPATIENT)
Dept: CARDIOLOGY CLINIC | Age: 51
End: 2021-12-14

## 2021-12-14 NOTE — TELEPHONE ENCOUNTER
R/t call to let them know that Dr. Balbina Gonsales verbally gave consent for pt to have procedure. C on 12/10/21 was normal.  She should have low risk for the colonoscopy procedure. Will fax to provided number of [phone].

## 2021-12-14 NOTE — TELEPHONE ENCOUNTER
Mabel Donis with [facility] states the patient is scheduled for a colonoscopy     Please fax cardiac clearance; [phone]

## 2021-12-27 LAB
CREATININE, EXTERNAL: 0.81
HBA1C MFR BLD HPLC: 5.7 %
LDL-C, EXTERNAL: 120

## 2021-12-30 ENCOUNTER — OFFICE VISIT (OUTPATIENT)
Dept: CARDIOLOGY CLINIC | Age: 51
End: 2021-12-30

## 2021-12-30 ENCOUNTER — OFFICE VISIT (OUTPATIENT)
Dept: CARDIOLOGY CLINIC | Age: 51
End: 2021-12-30
Payer: COMMERCIAL

## 2021-12-30 VITALS
BODY MASS INDEX: 27.16 KG/M2 | HEART RATE: 64 BPM | OXYGEN SATURATION: 99 % | WEIGHT: 163 LBS | SYSTOLIC BLOOD PRESSURE: 118 MMHG | DIASTOLIC BLOOD PRESSURE: 80 MMHG | RESPIRATION RATE: 16 BRPM | HEIGHT: 65 IN

## 2021-12-30 VITALS
SYSTOLIC BLOOD PRESSURE: 118 MMHG | HEART RATE: 64 BPM | WEIGHT: 163 LBS | HEIGHT: 65 IN | BODY MASS INDEX: 27.16 KG/M2 | DIASTOLIC BLOOD PRESSURE: 80 MMHG | OXYGEN SATURATION: 99 %

## 2021-12-30 DIAGNOSIS — I10 PRIMARY HYPERTENSION: ICD-10-CM

## 2021-12-30 DIAGNOSIS — R07.9 CHEST PAIN, UNSPECIFIED TYPE: Primary | ICD-10-CM

## 2021-12-30 PROCEDURE — 99214 OFFICE O/P EST MOD 30 MIN: CPT | Performed by: SPECIALIST

## 2021-12-30 RX ORDER — ISOSORBIDE MONONITRATE 30 MG/1
30 TABLET, EXTENDED RELEASE ORAL
Qty: 30 TABLET | Refills: 12 | Status: SHIPPED
Start: 2021-12-30 | End: 2022-01-07 | Stop reason: SINTOL

## 2021-12-30 NOTE — PROGRESS NOTES
Sudha Pinto MD. [facility]              Patient: Brandee Segura  : 1970      Today's Date: 2021          HISTORY OF PRESENT ILLNESS:     History of Present Illness: On  - Not felt well for weeks- has had general chest tightness - one day at work not feeling well, broke out in sweat --> went to [facility] and asked to see Cardiology. Since then just feels \"blah\" with mild chest discomfort. Since then BP high one day and has been following it at home - has been up and down. Walking up and down steps causes some discomfort. On 21 - A couple of days ago she got clammy at work and tingly. BP was 142/89 then. PAST MEDICAL HISTORY:     Past Medical History:   Diagnosis Date    Asthma     Elevated blood pressure reading     Mood disorder (HCC)     MADISON on CPAP          MEDICATIONS:     Current Outpatient Medications   Medication Sig Dispense Refill    sertraline (ZOLOFT) 25 mg tablet Take 4 Tablets by mouth daily. 1 Tablet 0    DULoxetine (CYMBALTA) 60 mg capsule Take 1 Capsule by mouth daily. 1 Capsule 0    budesonide-formoteroL (SYMBICORT) 160-4.5 mcg/actuation HFAA Take 2 Puffs by inhalation as needed (sob). 10.2 g 0    progesterone (PROMETRIUM) 200 mg capsule Take 200 mg by mouth daily.  estrogen,alvarado/me-testosterone (ESTROGENS-METHYLTESTOSTERONE PO) Take  by mouth.              Allergies   Allergen Reactions    Ofloxacin Vertigo, Nausea Only, Nausea and Vomiting, Other (comments) and Unknown (comments)           SOCIAL HISTORY:     Social History     Tobacco Use    Smoking status: Never Smoker    Smokeless tobacco: Never Used   Vaping Use    Vaping Use: Never used   Substance Use Topics    Alcohol use: Never    Drug use: Never         FAMILY HISTORY:     Family History   Problem Relation Age of Onset    Breast Cancer Maternal Aunt     Hypertension Father     Heart Attack Paternal Grandmother     Atrial Fibrillation Mother              REVIEW OF SYMPTOMS:     Review of Symptoms:  Constitutional: Negative for fever, chills  HEENT: Negative for nosebleeds, tinnitus, and vision changes. Respiratory: Negative for cough, wheezing  Cardiovascular: Negative for orthopnea, claudication, syncope, and PND. Gastrointestinal: Negative for abdominal pain, diarrhea, melena. Genitourinary: Negative for dysuria  Musculoskeletal: Negative for myalgias. Skin: Negative for rash  Heme: No problems bleeding. Neurological: Negative for speech change and focal weakness. + insomnia       PHYSICAL EXAM:     Physical Exam:  Visit Vitals  /80 (BP 1 Location: Left arm, BP Patient Position: Sitting, BP Cuff Size: Adult)   Pulse 64   Resp 16   Ht 5' 5\" (1.651 m)   Wt 163 lb (73.9 kg)   SpO2 99%   BMI 27.12 kg/m²     Patient appears generally well, mood and affect are appropriate and pleasant. HEENT:  Hearing intact, non-icteric, normocephalic, atraumatic. Neck Exam: Supple, No JVD    Lung Exam: Clear to auscultation, even breath sounds. Cardiac Exam: Regular rate and rhythm with no murmur or rub  Abdomen: Soft, non-tender   Extremities: Moves all ext well. No lower extremity edema. MSKTL: Overall good ROM ext  Skin: No significant rashes  Psych: Appropriate affect  Neuro - Grossly intact        LABS / OTHER STUDIES reviewed:     Lab Results   Component Value Date/Time    Sodium 140 12/07/2021 09:49 AM    Potassium 4.5 12/07/2021 09:49 AM    Chloride 103 12/07/2021 09:49 AM    CO2 24 12/07/2021 09:49 AM    Anion gap 7 07/16/2010 03:17 PM    Glucose 98 12/07/2021 09:49 AM    BUN 11 12/07/2021 09:49 AM    Creatinine 0.75 12/07/2021 09:49 AM    BUN/Creatinine ratio 15 12/07/2021 09:49 AM    GFR est  12/07/2021 09:49 AM    GFR est non-AA 91 12/07/2021 09:49 AM    Calcium 9.2 12/07/2021 09:49 AM           CARDIAC DIAGNOSTICS:     Cardiac Evaluation Includes:  I reviewed the results below.      EKG 11/24/21 - sinus, septal Q waves       Treadmill Stress Test 11/24/21 - walked 8 min (10 METS),   Ischemic EKG changes with 1 mm slow upsloping ST depression in the inferior leads at peak exercise and 1 mm horizontal ST depression in the inferior leads in recovery. 5/10 chest pain at peak exercise    Limited Echo 11/24/21 - LVEF 61%, no valve disease     Cardiac Cath 12/10/21 - normal cors         ASSESSMENT AND PLAN:     Assessment and Plan:  1) Atypical chest pain   - On 11/21 - had not felt well for weeks with chest discomfort that is worse with exertion   - Cardiac Cath 12/10/21 with mild irregs   - Echo showed normal heart structure and function   - She is wondering if she could have small vessel disease ---> will try Imdur 30 mg daily to see if this helps     2) HTN  - BP has been up and down but overall looks good - follow     3) See me in 3 months    for [facility]. Caring for ill father.  with 2 grown kids. Sharifa Marte MD, [place] [address], [address].  [address], [facility] [address]  Ph: [phone]   Ph [phone]

## 2021-12-30 NOTE — PROGRESS NOTES
Chief Complaint   Patient presents with    Follow-up     Visit Vitals  /80 (BP 1 Location: Left arm, BP Patient Position: Sitting, BP Cuff Size: Adult)   Pulse 64   Resp 16   Ht 5' 5\" (1.651 m)   Wt 163 lb (73.9 kg)   SpO2 99%   BMI 27.12 kg/m²     Presents in office today w/ c/o CP.

## 2021-12-30 NOTE — PROGRESS NOTES
Hilton Staples is a 46 y.o. female    Visit Vitals  /80 (BP 1 Location: Left upper arm, BP Patient Position: Sitting, BP Cuff Size: Adult)   Pulse 64   Ht 5' 5\" (1.651 m)   Wt 163 lb (73.9 kg)   SpO2 99%   BMI 27.12 kg/m²       Chief Complaint   Patient presents with    Chest Pain    Follow-up    Abnormal EKG       Chest pain YES  SOB NO  Dizziness YES, OCCASIONAL NAUSEA   Swelling NO  Recent hospital visit NO  Refills NO  COVID VACCINE STATUS YES  HAD COVID?  NO    BP VARIES FROM HIGH TO LOW

## 2022-01-07 ENCOUNTER — TELEPHONE (OUTPATIENT)
Dept: CARDIOLOGY CLINIC | Age: 52
End: 2022-01-07

## 2022-01-07 RX ORDER — RANOLAZINE 500 MG/1
500 TABLET, EXTENDED RELEASE ORAL 2 TIMES DAILY
Qty: 60 TABLET | Refills: 1 | Status: SHIPPED | OUTPATIENT
Start: 2022-01-07 | End: 2022-04-14

## 2022-01-07 NOTE — TELEPHONE ENCOUNTER
Patient was prescribed isosorbide mononitrate ER 30mg and stated she can not take it, patient stated she gets a really bad heahache when taking it, she has tried to stay on it for a few days in hopes that the headache will go away but it did not, please advise      [phone]

## 2022-01-07 NOTE — TELEPHONE ENCOUNTER
Spoke to pt,  Per Dr. David Collins: \"Stop Imdur.      She can try Ranexa 500 mg BID for her chest pain if she wants (as options #2)\"

## 2022-04-14 RX ORDER — RANOLAZINE 500 MG/1
TABLET, EXTENDED RELEASE ORAL
Qty: 180 TABLET | Refills: 1 | Status: SHIPPED | OUTPATIENT
Start: 2022-04-14

## 2022-04-14 NOTE — TELEPHONE ENCOUNTER
Refill per VO of Dr. Klaudia Montano  Last appt: 12/30/21  Future Appointments   Date Time Provider Griselda Long   5/4/2022  3:40 PM MD MANISHA Gold BS AMB       Requested Prescriptions     Signed Prescriptions Disp Refills    ranolazine ER (RANEXA) 500 mg SR tablet 180 Tablet 1     Sig: TAKE ONE TABLET BY MOUTH TWICE A DAY     Authorizing Provider: Janae Sanchez     Ordering User: Umu Torres

## 2022-05-04 ENCOUNTER — OFFICE VISIT (OUTPATIENT)
Dept: CARDIOLOGY CLINIC | Age: 52
End: 2022-05-04
Payer: COMMERCIAL

## 2022-05-04 VITALS
SYSTOLIC BLOOD PRESSURE: 114 MMHG | BODY MASS INDEX: 25.99 KG/M2 | HEIGHT: 65 IN | HEART RATE: 80 BPM | DIASTOLIC BLOOD PRESSURE: 80 MMHG | OXYGEN SATURATION: 99 % | WEIGHT: 156 LBS

## 2022-05-04 DIAGNOSIS — R07.9 CHEST PAIN, UNSPECIFIED TYPE: Primary | ICD-10-CM

## 2022-05-04 DIAGNOSIS — R03.0 ELEVATED BLOOD PRESSURE READING: ICD-10-CM

## 2022-05-04 PROCEDURE — 99214 OFFICE O/P EST MOD 30 MIN: CPT | Performed by: SPECIALIST

## 2022-05-04 NOTE — PROGRESS NOTES
Melanie Rossi MD. [facility]              Patient: Chris Charles  : 1970      Today's Date: 2022          HISTORY OF PRESENT ILLNESS:     History of Present Illness: On  - Not felt well for weeks- has had general chest tightness - one day at work not feeling well, broke out in sweat --> went to [facility] and asked to see Cardiology. Since then just feels \"blah\" with mild chest discomfort. Since then BP high one day and has been following it at home - has been up and down. Walking up and down steps causes some discomfort. On 21 - A couple of days ago she got clammy at work and tingly. BP was 142/89 then. On 22 - she is doing fine - lost weight - feels better on Imdur - no CP or SOB      PAST MEDICAL HISTORY:     Past Medical History:   Diagnosis Date    Asthma     Elevated blood pressure reading     Mood disorder (HCC)     MADISON on CPAP          MEDICATIONS:     Current Outpatient Medications   Medication Sig Dispense Refill    ranolazine ER (RANEXA) 500 mg SR tablet TAKE ONE TABLET BY MOUTH TWICE A  Tablet 1    sertraline (ZOLOFT) 25 mg tablet Take 4 Tablets by mouth daily. 1 Tablet 0    DULoxetine (CYMBALTA) 60 mg capsule Take 1 Capsule by mouth daily. 1 Capsule 0    budesonide-formoteroL (SYMBICORT) 160-4.5 mcg/actuation HFAA Take 2 Puffs by inhalation as needed (sob). 10.2 g 0    progesterone (PROMETRIUM) 200 mg capsule Take 200 mg by mouth daily.  estrogen,alvarado/me-testosterone (ESTROGENS-METHYLTESTOSTERONE PO) Take  by mouth.              Allergies   Allergen Reactions    Imdur [Isosorbide Mononitrate] Other (comments)     Headache    Ofloxacin Vertigo, Nausea Only, Nausea and Vomiting, Other (comments) and Unknown (comments)           SOCIAL HISTORY:     Social History     Tobacco Use    Smoking status: Never Smoker    Smokeless tobacco: Never Used   Vaping Use    Vaping Use: Never used   Substance Use Topics    Alcohol use: Never    Drug use: Never         FAMILY HISTORY:     Family History   Problem Relation Age of Onset    Breast Cancer Maternal Aunt     Hypertension Father     Heart Attack Paternal Grandmother     Atrial Fibrillation Mother              REVIEW OF SYMPTOMS:     Review of Symptoms:  Constitutional: Negative for fever, chills  HEENT: Negative for nosebleeds, tinnitus, and vision changes. Respiratory: Negative for cough, wheezing  Cardiovascular: Negative for orthopnea, claudication, syncope, and PND. Gastrointestinal: Negative for abdominal pain, diarrhea, melena. Genitourinary: Negative for dysuria  Musculoskeletal: Negative for myalgias. Skin: Negative for rash  Heme: No problems bleeding. Neurological: Negative for speech change and focal weakness. + insomnia       PHYSICAL EXAM:     Physical Exam:  Visit Vitals  /80 (BP 1 Location: Left upper arm, BP Patient Position: Sitting)   Pulse 80   Ht 5' 5\" (1.651 m)   Wt 156 lb (70.8 kg)   SpO2 99%   BMI 25.96 kg/m²     Patient appears generally well, mood and affect are appropriate and pleasant. HEENT:  Hearing intact, non-icteric, normocephalic, atraumatic. Neck Exam: Supple, No JVD    Lung Exam: Clear to auscultation, even breath sounds. Cardiac Exam: Regular rate and rhythm with no murmur or rub  Abdomen: Soft, non-tender   Extremities: Moves all ext well. No lower extremity edema.   MSKTL: Overall good ROM ext  Skin: No significant rashes  Psych: Appropriate affect  Neuro - Grossly intact        LABS / OTHER STUDIES reviewed:     Lab Results   Component Value Date/Time    Sodium 140 12/07/2021 09:49 AM    Potassium 4.5 12/07/2021 09:49 AM    Chloride 103 12/07/2021 09:49 AM    CO2 24 12/07/2021 09:49 AM    Anion gap 7 07/16/2010 03:17 PM    Glucose 98 12/07/2021 09:49 AM    BUN 11 12/07/2021 09:49 AM    Creatinine 0.75 12/07/2021 09:49 AM    BUN/Creatinine ratio 15 12/07/2021 09:49 AM    GFR est  12/07/2021 09:49 AM    GFR est non-AA 91 12/07/2021 09:49 AM    Calcium 9.2 12/07/2021 09:49 AM           CARDIAC DIAGNOSTICS:     Cardiac Evaluation Includes:  I reviewed the results below. EKG 11/24/21 - sinus, septal Q waves       Treadmill Stress Test 11/24/21 - walked 8 min (10 METS),   Ischemic EKG changes with 1 mm slow upsloping ST depression in the inferior leads at peak exercise and 1 mm horizontal ST depression in the inferior leads in recovery. 5/10 chest pain at peak exercise    Limited Echo 11/24/21 - LVEF 61%, no valve disease     Cardiac Cath 12/10/21 - normal cors         ASSESSMENT AND PLAN:     Assessment and Plan:  1) Atypical chest pain and CHACON - possible small vessel disease   - On 11/21 - had not felt well for weeks with chest discomfort that is worse with exertion   - Cardiac Cath 12/10/21 with mild irregs   - Echo showed normal heart structure and function   - Possible small vessel disease   - She feels better on Renexa 500 mg BID ---> her CP or SOB have resolved  ---> will cont Renexa (can increase dose later if needed) (she did have CP when she missed her Renexa doses)   - Also she has lost weight and feels better     2) HTN  - BP has been up and down but overall looks good - follow     3) MADISON - has an oral appliance    4) Mood disorder   - on meds     5) See me in 12 months    for [facility]. Dad passed Jan 2022.  with 2 grown kids. Aris Zhang MD, [address], [place] [address], [address] [place] [address]  Ph: [phone]   Ph [phone]

## 2022-05-04 NOTE — PROGRESS NOTES
Chief Complaint   Patient presents with    Follow-up    Hypertension     Visit Vitals  /80 (BP 1 Location: Left upper arm, BP Patient Position: Sitting)   Pulse 80   Ht 5' 5\" (1.651 m)   Wt 156 lb (70.8 kg)   SpO2 99%   BMI 25.96 kg/m²     Chest pain denied   SOB denied   Palpitations denied   Swelling in hands/feet denied   Dizziness denied   Recent hospital stays denied   Refills denied

## 2022-10-14 ENCOUNTER — TRANSCRIBE ORDER (OUTPATIENT)
Dept: SCHEDULING | Age: 52
End: 2022-10-14

## 2022-10-14 DIAGNOSIS — Z12.31 VISIT FOR SCREENING MAMMOGRAM: Primary | ICD-10-CM

## 2022-11-16 ENCOUNTER — HOSPITAL ENCOUNTER (OUTPATIENT)
Dept: MAMMOGRAPHY | Age: 52
Discharge: HOME | End: 2022-11-16
Attending: OBSTETRICS & GYNECOLOGY
Payer: COMMERCIAL

## 2022-11-16 DIAGNOSIS — Z12.31 VISIT FOR SCREENING MAMMOGRAM: ICD-10-CM

## 2022-11-16 PROCEDURE — 77067 SCR MAMMO BI INCL CAD: CPT

## 2022-12-28 LAB — HBA1C MFR BLD HPLC: 5.8 %

## 2023-04-25 RX ORDER — RANOLAZINE 500 MG/1
TABLET, EXTENDED RELEASE ORAL
Qty: 180 TABLET | Refills: 0 | Status: SHIPPED | OUTPATIENT
Start: 2023-04-25

## 2023-04-25 NOTE — TELEPHONE ENCOUNTER
Refill per VO of Dr. Jessica Dent  Last appt: 5/4/2022  Future Appointments   Date Time Provider Griselda Long   5/9/2023  3:00 PM MD MANISHA Ruggiero BS AMB       Requested Prescriptions     Signed Prescriptions Disp Refills    ranolazine ER (RANEXA) 500 mg SR tablet 180 Tablet 0     Sig: TAKE ONE TABLET BY MOUTH TWICE A DAY     Authorizing Provider: Mushtaq Martinez     Ordering User: Macy Basilio

## 2023-05-09 ENCOUNTER — OFFICE VISIT (OUTPATIENT)
Age: 53
End: 2023-05-09
Payer: COMMERCIAL

## 2023-05-09 VITALS
HEIGHT: 65 IN | WEIGHT: 161 LBS | DIASTOLIC BLOOD PRESSURE: 80 MMHG | OXYGEN SATURATION: 98 % | SYSTOLIC BLOOD PRESSURE: 110 MMHG | BODY MASS INDEX: 26.82 KG/M2 | HEART RATE: 68 BPM

## 2023-05-09 DIAGNOSIS — R94.31 ABNORMAL ELECTROCARDIOGRAM (ECG) (EKG): ICD-10-CM

## 2023-05-09 DIAGNOSIS — R07.9 CHEST PAIN, UNSPECIFIED TYPE: Primary | ICD-10-CM

## 2023-05-09 PROCEDURE — 93000 ELECTROCARDIOGRAM COMPLETE: CPT | Performed by: SPECIALIST

## 2023-05-09 PROCEDURE — 99214 OFFICE O/P EST MOD 30 MIN: CPT | Performed by: SPECIALIST

## 2023-05-09 RX ORDER — ESTERIFIED ESTROGEN AND METHYLTESTOSTERONE 1.25; 2.5 MG/1; MG/1
TABLET ORAL
COMMUNITY
Start: 2021-11-01

## 2023-05-09 RX ORDER — SERTRALINE HYDROCHLORIDE 100 MG/1
TABLET, FILM COATED ORAL
COMMUNITY
Start: 2023-03-24

## 2023-05-09 RX ORDER — RANOLAZINE 500 MG/1
500 TABLET, EXTENDED RELEASE ORAL 2 TIMES DAILY
Qty: 180 TABLET | Refills: 3 | Status: SHIPPED | OUTPATIENT
Start: 2023-05-09

## 2023-05-09 RX ORDER — METHOCARBAMOL 500 MG/1
500 TABLET, FILM COATED ORAL NIGHTLY PRN
COMMUNITY
Start: 2022-07-11

## 2023-05-09 RX ORDER — FLUCONAZOLE 200 MG/1
TABLET ORAL
COMMUNITY
Start: 2023-03-25

## 2023-05-09 RX ORDER — NAPROXEN 500 MG/1
500 TABLET ORAL 2 TIMES DAILY WITH MEALS
COMMUNITY
Start: 2022-07-11

## 2023-05-09 NOTE — PROGRESS NOTES
Chief Complaint   Patient presents with    Follow-up     Annual     Hypertension       /80 (Site: Left Upper Arm, Position: Sitting)   Pulse 68   Ht 5' 5\" (1.651 m)   Wt 161 lb (73 kg)   SpO2 98%   BMI 26.79 kg/m²       Chest pain denied     SOB denied     Palpitations denied     Swelling in hands/feet denied     Dizziness denied     Recent hospital stays denied     Refills denied
and vision changes. Respiratory: Negative for cough, wheezing  Cardiovascular: Negative for orthopnea, claudication, syncope  Gastrointestinal: Negative for abdominal pain, diarrhea, melena. Genitourinary: Negative for dysuria  Musculoskeletal: Negative for myalgias. Skin: Negative for rash  Heme: No problems bleeding. Neurological: Negative for speech change and focal weakness. PHYSICAL EXAM:     Physical Exam:  /80 (Site: Left Upper Arm, Position: Sitting)   Pulse 68   Ht 5' 5\" (1.651 m)   Wt 161 lb (73 kg)   SpO2 98%   BMI 26.79 kg/m²     Patient appears generally well, mood and affect are appropriate and pleasant. HEENT:  Hearing intact, non-icteric, normocephalic, atraumatic. Neck Exam: Supple, No JVD   Lung Exam: Clear to auscultation, even breath sounds. Cardiac Exam: Regular rate and rhythm with no murmur or rub  Abdomen: Soft, non-tender  Extremities: Moves all ext well. No lower extremity edema. MSKTL: Overall good ROM ext  Skin: No significant rashes  Psych: Appropriate affect  Neuro - Grossly intact        LABS / OTHER STUDIES:         Labs 12/27/22 - chol 170, TG 94, HDL 45, , CMP OK, CBC OK, A1c 5.8         CARDIAC DIAGNOSTICS:     Cardiac Evaluation Includes:  I reviewed the test results below. EKG 11/24/21 - sinus, septal Q waves   EKG 5/9/23 - NSR, septal Q waves          Treadmill Stress Test 11/24/21 - walked 8 min (10 METS),   Ischemic EKG changes with 1 mm slow upsloping ST depression in the inferior leads at peak exercise and 1 mm horizontal ST depression in the inferior leads in recovery.   5/10 chest pain at peak  exercise      Limited Echo 11/24/21 - LVEF 61%, no valve disease       Cardiac Cath 12/10/21 - normal cors               ASSESSMENT AND PLAN:        Assessment and Plan:   1) Atypical chest pain and CHAPPELL - possible small vessel disease    - On 11/21 - had not felt well for weeks with chest discomfort that is worse with exertion    - Cardiac Cath

## 2023-11-21 ENCOUNTER — TRANSCRIBE ORDERS (OUTPATIENT)
Facility: HOSPITAL | Age: 53
End: 2023-11-21

## 2023-11-21 ENCOUNTER — HOSPITAL ENCOUNTER (OUTPATIENT)
Facility: HOSPITAL | Age: 53
Discharge: HOME | End: 2023-11-24
Payer: COMMERCIAL

## 2023-11-21 VITALS — WEIGHT: 160 LBS | HEIGHT: 65 IN | BODY MASS INDEX: 26.66 KG/M2

## 2023-11-21 DIAGNOSIS — Z12.31 VISIT FOR SCREENING MAMMOGRAM: Primary | ICD-10-CM

## 2023-11-21 DIAGNOSIS — Z12.31 VISIT FOR SCREENING MAMMOGRAM: ICD-10-CM

## 2023-11-21 PROCEDURE — 77063 BREAST TOMOSYNTHESIS BI: CPT

## 2024-06-26 ENCOUNTER — OFFICE VISIT (OUTPATIENT)
Age: 54
End: 2024-06-26
Payer: COMMERCIAL

## 2024-06-26 VITALS
WEIGHT: 156 LBS | SYSTOLIC BLOOD PRESSURE: 110 MMHG | BODY MASS INDEX: 25.99 KG/M2 | DIASTOLIC BLOOD PRESSURE: 68 MMHG | OXYGEN SATURATION: 97 % | HEART RATE: 74 BPM | HEIGHT: 65 IN

## 2024-06-26 DIAGNOSIS — R07.9 CHEST PAIN, UNSPECIFIED TYPE: Primary | ICD-10-CM

## 2024-06-26 DIAGNOSIS — R94.31 ABNORMAL ELECTROCARDIOGRAM (ECG) (EKG): ICD-10-CM

## 2024-06-26 PROCEDURE — 93000 ELECTROCARDIOGRAM COMPLETE: CPT | Performed by: SPECIALIST

## 2024-06-26 PROCEDURE — 99214 OFFICE O/P EST MOD 30 MIN: CPT | Performed by: SPECIALIST

## 2024-06-26 RX ORDER — FEZOLINETANT 45 MG/1
TABLET, FILM COATED ORAL DAILY
COMMUNITY
Start: 2024-02-15

## 2024-06-26 NOTE — PATIENT INSTRUCTIONS
serving is 1 slice of bread, 1 ounce of dry cereal, or 1/2 cup of cooked rice, pasta, or cooked cereal. Try to choose whole-grain products as much as possible.  Limit lean meat, poultry, and fish to 6 ounces or less each day. One egg counts as 1 ounce.  Eat 4 to 5 servings of nuts, seeds, and legumes (cooked dried beans, lentils, and split peas) each week. A serving is 1/3 cup of nuts, 2 tablespoons of seeds, 2 tablespoons of peanut butter, or 1/2 cup of cooked beans or peas.  Limit fats and oils to 2 to 3 servings each day. A serving is 1 teaspoon of vegetable oil or 2 tablespoons of salad dressing.  Limit sweets and added sugars to 5 servings or less a week. A serving is 1 tablespoon jelly or jam, 1/2 cup sorbet, or 1 cup of lemonade.  Eat less than 2,300 milligrams (mg) of sodium a day. If you limit your sodium to 1,500 mg a day, you can lower your blood pressure even more.  Be aware that all of these are the suggested number of servings for people who eat 1,800 to 2,000 calories a day. Your recommended number of servings may be different if you need more or fewer calories.  Tips for success  Start small. Make small changes, and stick with them. Once those changes become habit, add a few more changes.  Try some of the following:  Make it a goal to eat a fruit or vegetable at every meal and at snacks. This will make it easy to get the recommended amount of fruits and vegetables each day.  Try yogurt topped with fruit and nuts for a snack or healthy dessert.  Add lettuce, tomato, cucumber, and onion to sandwiches.  Have a variety of cut-up vegetables with a low-fat dip as an appetizer instead of chips and dip.  Sprinkle sunflower seeds or chopped almonds over salads. Or try adding chopped walnuts or almonds to cooked vegetables.  Try some vegetarian meals using beans and peas. Add garbanzo or kidney beans to salads. Make burritos and tacos with mashed soriano beans or black beans.  Where can you learn more?  Go to

## 2024-06-26 NOTE — PROGRESS NOTES
Chief Complaint   Patient presents with    Annual Exam    Chest Pain     Vitals:    06/26/24 0838   BP: 110/68   Site: Left Upper Arm   Position: Sitting   Cuff Size: Medium Adult   Pulse: 74   SpO2: 97%   Weight: 70.8 kg (156 lb)   Height: 1.651 m (5' 5\")       Chest pain NO     ER, urgent care, or hospitalized outside of [facility] since your last visit?  NO     Refills NO   
on Renexa 500 mg BID ---> her CP or SOB have resolved  ---> will cont Renexa (she takes it just one daily)  - Also she has lost weight and feels better      2) HTN  - BP has been OK off of meds       3) WILLA - has an oral appliance     4) Mood disorder    - on meds      5) See me in 12 months      for [place].  Dad passed Jan 2022 and Mom in 2023.   with 2 grown kids.     Hussain Lowe MD, FACC    [facility] [place]  [facility]  [address], [address]  [address]  [place], [place]  [address]  [place] [address]  Ph: [phone]   Ph [phone]

## 2024-07-02 RX ORDER — RANOLAZINE 500 MG/1
500 TABLET, EXTENDED RELEASE ORAL 2 TIMES DAILY
Qty: 180 TABLET | Refills: 3 | Status: SHIPPED | OUTPATIENT
Start: 2024-07-02

## 2024-07-02 NOTE — TELEPHONE ENCOUNTER
Refill per VO of Dr. Lowe  Last appt: 6/26/2024    Future Appointments   Date Time Provider Department Center   7/1/2025  8:20 AM Hussain Lowe MD [facility]       Requested Prescriptions     Signed Prescriptions Disp Refills    ranolazine (RANEXA) 500 MG extended release tablet 180 tablet 3     Sig: TAKE ONE TABLET BY MOUTH TWICE A DAY     Authorizing Provider: HUSSAIN LOWE     Ordering User: ITZEL LEWIS

## 2024-11-22 ENCOUNTER — HOSPITAL ENCOUNTER (OUTPATIENT)
Facility: HOSPITAL | Age: 54
Discharge: HOME | End: 2024-11-22
Payer: COMMERCIAL

## 2024-11-22 DIAGNOSIS — Z12.31 VISIT FOR SCREENING MAMMOGRAM: ICD-10-CM

## 2024-11-22 PROCEDURE — 77063 BREAST TOMOSYNTHESIS BI: CPT

## 2024-12-23 ENCOUNTER — TRANSCRIBE ORDERS (OUTPATIENT)
Facility: HOSPITAL | Age: 54
End: 2024-12-23

## 2024-12-23 ENCOUNTER — HOSPITAL ENCOUNTER (OUTPATIENT)
Facility: HOSPITAL | Age: 54
Discharge: HOME | End: 2024-12-26
Payer: COMMERCIAL

## 2024-12-23 DIAGNOSIS — M15.0 PRIMARY GENERALIZED HYPERTROPHIC OSTEOARTHROSIS: Primary | ICD-10-CM

## 2024-12-23 DIAGNOSIS — M15.0 PRIMARY GENERALIZED HYPERTROPHIC OSTEOARTHROSIS: ICD-10-CM

## 2024-12-23 PROCEDURE — 73130 X-RAY EXAM OF HAND: CPT

## (undated) DEVICE — SYRINGE MED 10ML RED POLYCARB BRL FIX M LUER CONN FLAT GRP

## (undated) DEVICE — HEART CATH-MRMC: Brand: MEDLINE INDUSTRIES, INC.

## (undated) DEVICE — SYR MED COLOR CODED 3ML RED -- MEDALLION

## (undated) DEVICE — GLIDESHEATH SLENDER ACCESS KIT: Brand: GLIDESHEATH SLENDER

## (undated) DEVICE — TUBING PRSS MON L12IN PVC RIG NONEXPANDING M TO FEM CONN

## (undated) DEVICE — TR BAND RADIAL ARTERY COMPRESSION DEVICE: Brand: TR BAND

## (undated) DEVICE — SUPPORT WRST AD W3.5XL9IN DIA14.5IN ART SFT ADJ HK AND LOOP

## (undated) DEVICE — ROSEN CURVED WIRE GUIDE: Brand: ROSEN

## (undated) DEVICE — CATH DIAG D 5F 155 MULTIPK X3 -- IMPULSE 16391-302

## (undated) DEVICE — CATHETER IV STR 22 GAX1 IN RADIOPAQUE SURFLO

## (undated) DEVICE — CATHETER GUID 5FR GWIRE 0.058IN COR EXTRA BKUP SUPP 4 ACT

## (undated) DEVICE — PROCEDURE KIT FLUID MGMT CUST MAINFOLD STRL